# Patient Record
Sex: MALE | Race: WHITE | Employment: UNEMPLOYED | URBAN - METROPOLITAN AREA
[De-identification: names, ages, dates, MRNs, and addresses within clinical notes are randomized per-mention and may not be internally consistent; named-entity substitution may affect disease eponyms.]

---

## 2019-07-05 ENCOUNTER — HOSPITAL ENCOUNTER (EMERGENCY)
Age: 29
Discharge: NURSING, REHAB OR LONG-TERM CARE | End: 2019-07-05
Attending: EMERGENCY MEDICINE
Payer: COMMERCIAL

## 2019-07-05 ENCOUNTER — APPOINTMENT (OUTPATIENT)
Dept: CT IMAGING | Age: 29
End: 2019-07-05
Payer: COMMERCIAL

## 2019-07-05 VITALS
BODY MASS INDEX: 33.18 KG/M2 | WEIGHT: 245 LBS | OXYGEN SATURATION: 97 % | HEIGHT: 72 IN | HEART RATE: 118 BPM | RESPIRATION RATE: 16 BRPM | DIASTOLIC BLOOD PRESSURE: 99 MMHG | TEMPERATURE: 98 F | SYSTOLIC BLOOD PRESSURE: 154 MMHG

## 2019-07-05 DIAGNOSIS — S02.92XA MULTIPLE CLOSED FRACTURES OF FACIAL BONE, INITIAL ENCOUNTER (HCC): Primary | ICD-10-CM

## 2019-07-05 PROCEDURE — 96376 TX/PRO/DX INJ SAME DRUG ADON: CPT

## 2019-07-05 PROCEDURE — 70480 CT ORBIT/EAR/FOSSA W/O DYE: CPT

## 2019-07-05 PROCEDURE — 99283 EMERGENCY DEPT VISIT LOW MDM: CPT

## 2019-07-05 PROCEDURE — 96374 THER/PROPH/DIAG INJ IV PUSH: CPT

## 2019-07-05 PROCEDURE — 6360000002 HC RX W HCPCS: Performed by: PHYSICIAN ASSISTANT

## 2019-07-05 PROCEDURE — 2580000003 HC RX 258: Performed by: PHYSICIAN ASSISTANT

## 2019-07-05 PROCEDURE — 72125 CT NECK SPINE W/O DYE: CPT

## 2019-07-05 PROCEDURE — 70486 CT MAXILLOFACIAL W/O DYE: CPT

## 2019-07-05 PROCEDURE — 70450 CT HEAD/BRAIN W/O DYE: CPT

## 2019-07-05 RX ORDER — MORPHINE SULFATE 4 MG/ML
4 INJECTION, SOLUTION INTRAMUSCULAR; INTRAVENOUS EVERY 30 MIN PRN
Status: DISCONTINUED | OUTPATIENT
Start: 2019-07-05 | End: 2019-07-05 | Stop reason: HOSPADM

## 2019-07-05 RX ORDER — 0.9 % SODIUM CHLORIDE 0.9 %
1000 INTRAVENOUS SOLUTION INTRAVENOUS ONCE
Status: COMPLETED | OUTPATIENT
Start: 2019-07-05 | End: 2019-07-05

## 2019-07-05 RX ORDER — MORPHINE SULFATE 4 MG/ML
4 INJECTION, SOLUTION INTRAMUSCULAR; INTRAVENOUS EVERY 30 MIN PRN
Status: DISCONTINUED | OUTPATIENT
Start: 2019-07-05 | End: 2019-07-05

## 2019-07-05 RX ADMIN — MORPHINE SULFATE 4 MG: 4 INJECTION INTRAVENOUS at 03:08

## 2019-07-05 RX ADMIN — SODIUM CHLORIDE 1000 ML: 9 INJECTION, SOLUTION INTRAVENOUS at 01:26

## 2019-07-05 RX ADMIN — MORPHINE SULFATE 4 MG: 4 INJECTION INTRAVENOUS at 02:16

## 2019-07-05 ASSESSMENT — PAIN SCALES - GENERAL
PAINLEVEL_OUTOF10: 10
PAINLEVEL_OUTOF10: 0
PAINLEVEL_OUTOF10: 7
PAINLEVEL_OUTOF10: 8

## 2019-07-05 NOTE — ED NOTES
Report called to [facility] at this time        Susie Pedro [facility]  07/05/19 0169

## 2019-07-05 NOTE — ED NOTES
Impression   1. No evidence of acute fracture in the cervical spine. 2. Thickened secretions in the nasopharynx.         Molli Homans, RN  07/05/19 6543
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
## 2022-04-19 ENCOUNTER — OFFICE VISIT (OUTPATIENT)
Dept: ORTHOPEDIC SURGERY | Age: 32
End: 2022-04-19
Payer: COMMERCIAL

## 2022-04-19 VITALS
HEIGHT: 72 IN | WEIGHT: 230 LBS | OXYGEN SATURATION: 98 % | HEART RATE: 83 BPM | BODY MASS INDEX: 31.15 KG/M2 | RESPIRATION RATE: 16 BRPM

## 2022-04-19 DIAGNOSIS — M25.361 KNEE INSTABILITY, RIGHT: Primary | ICD-10-CM

## 2022-04-19 PROCEDURE — 4004F PT TOBACCO SCREEN RCVD TLK: CPT | Performed by: STUDENT IN AN ORGANIZED HEALTH CARE EDUCATION/TRAINING PROGRAM

## 2022-04-19 PROCEDURE — G8427 DOCREV CUR MEDS BY ELIG CLIN: HCPCS | Performed by: STUDENT IN AN ORGANIZED HEALTH CARE EDUCATION/TRAINING PROGRAM

## 2022-04-19 PROCEDURE — G8417 CALC BMI ABV UP PARAM F/U: HCPCS | Performed by: STUDENT IN AN ORGANIZED HEALTH CARE EDUCATION/TRAINING PROGRAM

## 2022-04-19 PROCEDURE — 99203 OFFICE O/P NEW LOW 30 MIN: CPT | Performed by: STUDENT IN AN ORGANIZED HEALTH CARE EDUCATION/TRAINING PROGRAM

## 2022-04-19 ASSESSMENT — ENCOUNTER SYMPTOMS
SORE THROAT: 0
SHORTNESS OF BREATH: 0
BACK PAIN: 0
VOICE CHANGE: 0
VOMITING: 0
COUGH: 0
COLOR CHANGE: 0
WHEEZING: 0
NAUSEA: 0

## 2022-04-19 NOTE — PATIENT INSTRUCTIONS
Continue to weight bear as tolerated in the brace. Physical therapy ordered today. Follow up in 3 months. Patient Education        Knee: Exercises  Introduction  Here are some examples of exercises for you to try. The exercises may be suggested for a condition or for rehabilitation. Start each exercise slowly. Ease off the exercises if you start to have pain. You will be told when to start these exercises and which ones will work bestfor you. How to do the exercises  Quad sets    1. Sit with your leg straight and supported on the floor or a firm bed. (If you feel discomfort in the front or back of your knee, place a small towel roll under your knee.)  2. Tighten the muscles on top of your thigh by pressing the back of your knee flat down to the floor. (If you feel discomfort under your kneecap, place a small towel roll under your knee.)  3. Hold for about 6 seconds, then rest for up to 10 seconds. 4. Do 8 to 12 repetitions several times a day. Straight-leg raises to the front    1. Lie on your back with your good knee bent so that your foot rests flat on the floor. Your injured leg should be straight. Make sure that your low back has a normal curve. You should be able to slip your flat hand in between the floor and the small of your back, with your palm touching the floor and your back touching the back of your hand. 2. Tighten the thigh muscles in the injured leg by pressing the back of your knee flat down to the floor. Hold your knee straight. 3. Keeping the thigh muscles tight, lift your injured leg up so that your heel is about 12 inches off the floor. Hold for about 6 seconds and then lower slowly. 4. Do 8 to 12 repetitions, 3 times a day. Straight-leg raises to the outside    1. Lie on your side, with your injured leg on top. 2. Tighten the front thigh muscles of your injured leg to keep your knee straight.   3. Keep your hip and your leg straight in line with the rest of your body, and keep your knee pointing forward. Do not drop your hip back. 4. Lift your injured leg straight up toward the ceiling, about 12 inches off the floor. Hold for about 6 seconds, then slowly lower your leg. 5. Do 8 to 12 repetitions. Straight-leg raises to the back    1. Lie on your stomach, and lift your leg straight up behind you (toward the ceiling). 2. Lift your toes about 6 inches off the floor, hold for about 6 seconds, then lower slowly. 3. Do 8 to 12 repetitions. Straight-leg raises to the inside    1. Lie on the side of your body with the injured leg. 2. You can either prop your other (good) leg up on a chair, or you can bend your good knee and put that foot in front of your injured knee. Do not drop your hip back. 3. Tighten the muscles on the front of your thigh to straighten your injured knee. 4. Keep your kneecap pointing forward, and lift your whole leg up toward the ceiling about 6 inches. Hold for about 6 seconds, then lower slowly. 5. Do 8 to 12 repetitions. Heel dig bridging    1. Lie on your back with both knees bent and your ankles bent so that only your heels are digging into the floor. Your knees should be bent about 90 degrees. 2. Then push your heels into the floor, squeeze your buttocks, and lift your hips off the floor until your shoulders, hips, and knees are all in a straight line. 3. Hold for about 6 seconds as you continue to breathe normally, and then slowly lower your hips back down to the floor and rest for up to 10 seconds. 4. Do 8 to 12 repetitions. Hamstring curls    1. Lie on your stomach with your knees straight. If your kneecap is uncomfortable, roll up a washcloth and put it under your leg just above your kneecap. 2. Lift the foot of your injured leg by bending the knee so that you bring the foot up toward your buttock. If this motion hurts, try it without bending your knee quite as far. This may help you avoid any painful motion.   3. Slowly lower your leg back to the floor.  4. Do 8 to 12 repetitions. 5. With permission from your doctor or physical therapist, you may also want to add a cuff weight to your ankle (not more than 5 pounds). With weight, you do not have to lift your leg more than 12 inches to get a hamstring workout. Shallow standing knee bends    Do this exercise only if you have very little pain; if you have no clicking, locking, or giving way if you have an injured knee; and if it does not hurtwhile you are doing 8 to 12 repetitions. 1. Stand with your hands lightly resting on a counter or chair in front of you. Put your feet shoulder-width apart. 2. Slowly bend your knees so that you squat down like you are going to sit in a chair. Make sure your knees do not go in front of your toes. 3. Lower yourself about 6 inches. Your heels should remain on the floor at all times. 4. Rise slowly to a standing position. Heel raises    1. Stand with your feet a few inches apart, with your hands lightly resting on a counter or chair in front of you. 2. Slowly raise your heels off the floor while keeping your knees straight. 3. Hold for about 6 seconds, then slowly lower your heels to the floor. 4. Do 8 to 12 repetitions several times during the day. Follow-up care is a key part of your treatment and safety. Be sure to make and go to all appointments, and call your doctor if you are having problems. It's also a good idea to know your test results and keep alist of the medicines you take. Where can you learn more? Go to [email or web address] org and sign in to your [employer] account. Enter K217 in the [facility] box to learn more about \"Knee: Exercises. \"     If you do not have an account, please click on the \"Sign Up Now\" link. Current as of: July 1, 2021               Content Version: 13.2  © 6848-0746 Healthwise, Incorporated. Care instructions adapted under license by [facility] ([facility]).  If you have questions about a medical condition or

## 2022-04-19 NOTE — PROGRESS NOTES
Patient is a 27year old male. Patient is in the office today with right knee pain. Patient states that he/she injured themselves by wrestling. He describes his knee being medially rotated. He felt a pop and immediate pain. Patient states that the injury happened mid March of 2022. Pain scale  2/10 currently but his pain increases with activity. He states his pain is global and feels like it is deep in the joint.    Occupation: tree work

## 2022-04-19 NOTE — PROGRESS NOTES
4/19/2022   Chief Complaint   Patient presents with    Knee Pain     right        History of Present Illness:                             Grey Scott is a 27 y.o. male  referred by self for evaluation and treatment of right knee pain. This is evaluated as a personal injury. Patient has a history of prior left and right knee ACL reconstruction. He states he had a left knee ACL reconstruction done many years ago and did have a meniscus repair. He states that he had the right knee ACL reconstruction done by an outside physician in 2017 and states it has been doing well until an injury in March 2022. Patient states he was horse playing when he rotated the knee and felt a painful pop in the knee and had immediate pain. He states that since then the pain has been improving but he continues with pain with rotational movement or exercise. He has no other complaints and no other injury since the surgery. He states that his mildly improved. He has no other complaints at this time. He is here today for his first visit with myself. No recent advanced imaging since injury. The pain's location is diffuse, mild medial joint line pain. he describes the symptoms as aching, dull. Symptoms improve with rest. Symptoms worsen with deep knee bending,twisting activities. Patient admits to prior injury to knee, denies numbness, tingling, fever, chills. Admits to mild swelling. No prominent mechanical symptoms. Denies instability. Worse with exercise. Better with rest.    Treatment thus far has included rest, activity modifications, oral medications without significant relief. Here today to discuss diagnosis and treatment options. Is affecting ADLs. Pain is 5/10 at it's worst.    No advanced imaging thus far    Outside reports reviewed: none. Patient's medications, allergies, past medical, surgical, social and family histories were reviewed and updated as appropriate.      MA HPI: Patient is a 27 year old male. Patient is in the office today with right knee pain. Patient states that he/she injured themselves by wrestling. He describes his knee being medially rotated. He felt a pop and immediate pain. Patient states that the injury happened mid March of 2022. Pain scale  2/10 currently but his pain increases with activity. He states his pain is global and feels like it is deep in the joint. Occupation: tree work      Medical History  Patient's medications, allergies, past medical, surgical, social and family histories were reviewed and updated as appropriate. No past medical history on file. Past Surgical History:   Procedure Laterality Date    ANTERIOR CRUCIATE LIGAMENT REPAIR      HERNIA REPAIR      WISDOM TOOTH EXTRACTION       No family history on file. Social History     Socioeconomic History    Marital status: Legally      Spouse name: Not on file    Number of children: Not on file    Years of education: Not on file    Highest education level: Not on file   Occupational History    Not on file   Tobacco Use    Smoking status: Never Smoker    Smokeless tobacco: Never Used   Substance and Sexual Activity    Alcohol use: Yes     Comment: occasionally    Drug use: No    Sexual activity: Yes     Partners: Female   Other Topics Concern    Not on file   Social History Narrative    Not on file     Social Determinants of Health     Financial Resource Strain:     Difficulty of Paying Living Expenses: Not on file   Food Insecurity:     Worried About Running Out of Food in the Last Year: Not on file    Sergo of Food in the Last Year: Not on file   Transportation Needs:     Lack of Transportation (Medical): Not on file    Lack of Transportation (Non-Medical):  Not on file   Physical Activity:     Days of Exercise per Week: Not on file    Minutes of Exercise per Session: Not on file   Stress:     Feeling of Stress : Not on file   Social Connections:     Frequency of Communication with Friends and Family: Not on file    Frequency of Social Gatherings with Friends and Family: Not on file    Attends Roman Catholic Services: Not on file    Active Member of Clubs or Organizations: Not on file    Attends Club or Organization Meetings: Not on file    Marital Status: Not on file   Intimate Partner Violence:     Fear of Current or Ex-Partner: Not on file    Emotionally Abused: Not on file    Physically Abused: Not on file    Sexually Abused: Not on file   Housing Stability:     Unable to Pay for Housing in the Last Year: Not on file    Number of [place] in the Last Year: Not on file    Unstable Housing in the Last Year: Not on file     Current Outpatient Medications   Medication Sig Dispense Refill    vitamin D-3 (CHOLECALCIFEROL) 125 MCG (5000 UT) TABS take 1 tablet by mouth once daily      famotidine (PEPCID) 20 MG tablet Take 1 tablet by mouth 2 times daily 30 tablet 0    sucralfate (CARAFATE) 1 GM tablet Take 1 tablet by mouth 4 times daily 60 tablet 0    ondansetron (ZOFRAN) 4 MG tablet Take 1 tablet by mouth every 8 hours as needed for Nausea (Patient not taking: Reported on 4/19/2022) 10 tablet 0     No current facility-administered medications for this visit. No Known Allergies      Review of Systems   Constitutional: Positive for activity change. Negative for fatigue and fever. HENT: Negative for sneezing, sore throat and voice change. Respiratory: Negative for cough, shortness of breath and wheezing. Cardiovascular: Negative for leg swelling. Gastrointestinal: Negative for nausea and vomiting. Musculoskeletal: Positive for arthralgias, joint swelling and myalgias. Negative for back pain, gait problem, neck pain and neck stiffness. Skin: Negative for color change, rash and wound. Neurological: Negative for weakness and numbness. Psychiatric/Behavioral: Negative for behavioral problems, confusion and self-injury. Examination:  General Exam:  Vitals: Pulse 83   Resp 16   Ht 6' (1.829 m)   Wt 230 lb (104.3 kg)   SpO2 98%   BMI 31.19 kg/m²    Physical Exam  Constitutional:       General: He is not in acute distress. Appearance: Normal appearance. HENT:      Head: Normocephalic and atraumatic. Eyes:      General:         Right eye: No discharge. Left eye: No discharge. Extraocular Movements: Extraocular movements intact. Cardiovascular:      Pulses: Normal pulses. Pulmonary:      Effort: Pulmonary effort is normal.      Breath sounds: Normal breath sounds. Musculoskeletal:         General: Swelling, tenderness and signs of injury present. No deformity. Cervical back: Normal range of motion. Right hip: Normal.      Left hip: Normal.      Right upper leg: Normal.      Left upper leg: Normal.      Right knee: Swelling and bony tenderness present. No deformity, effusion, erythema, ecchymosis, lacerations or crepitus. Normal range of motion. Tenderness present over the medial joint line. No lateral joint line tenderness. No LCL laxity, MCL laxity, ACL laxity or PCL laxity. Normal alignment, normal meniscus and normal patellar mobility. Normal pulse. Instability Tests: Anterior drawer test negative. Posterior drawer test negative. Anterior Lachman test negative. Medial Beto test negative and lateral Beto test negative. Left knee: Normal.      Right lower leg: Normal. No edema. Left lower leg: Normal. No edema. Right ankle: Normal.      Left ankle: Normal.      Right foot: Normal.      Left foot: Normal.   Skin:     General: Skin is warm and dry. Capillary Refill: Capillary refill takes less than 2 seconds. Neurological:      General: No focal deficit present. Mental Status: He is alert and oriented to person, place, and time. Mental status is at baseline.       Gait: Gait normal.   Psychiatric:         Mood and Affect: Mood normal. Behavior: Behavior normal.        RIGHT KNEE EXAMINATION       OBSERVATION / INSPECTION     Gait: Nonantalgic     Alignment: Neutral     Scars: Previous arthroscopy portals    Muscle atrophy: Minimal    Effusion: None but minimal swelling diffusely    Warmth: None     Discoloration: none       TENDERNESS / CREPITUS (T / C): Patella - / -     Lateral joint line - / -  Medial joint line  + / -     Peripatellar lateral - / -  Peripatellar medial  - / -     Medial plica - / -  Lateral plica - / -    Patellar tendon - / -   Prepatellar Bursa - / -     Popliteal fossa - / -    Gastrocnemius - / -    Quadricep - / -   Quad tendon - / -      Tibial tubercle - / -     Thigh/hamstring - / -  Pes anserine/HS - / -     ITB - / -     Tibia - / -   Fibula - / -    Tib/fib joint - / -     MFC - / -    LFC - / -     MCL: Proximal - / -  Distal - / -    LCL - / -    MCL - / -      ROM: (* = pain)  PASSIVE  ACTIVE     Left :    0 / 145  0 / 145      Right :    0 / 145  0 / 145      PATELLOFEMORAL EXAMINATION:    See above noted areas of tenderness.      Patella position     Subluxation / dislocation: Centered     Sup. / Inf; Normal     Crepitus (PF): Absent     Patellar Mobility:     Medial-lateral: Normal     Superior-inferior: Normal     Inferior tilt Normal     Patellar tendon: Normal     Lateral tilt: Normal     J-sign: None     Patellofemoral grind: No pain         MENISCAL SIGNS:     Pain on terminal extension: -    Pain on terminal flexion: -    Betos maneuver: -     Squat: -       LIGAMENT EXAMINATION:    ACL / Lachman: 2A    PCL-Post.  drawer: normal 0 to 2mm    MCL- Valgus: normal 0 to 2mm    LCL- Varus:  normal 0 to 2mm    Pivot shift: Minimally positive for pain    Dial Test: No difference c/w other side    At 30° flexion: normal (< 5°)     At 90° flexion: normal (< 5°)       STRENGTH: (* = with pain) PAINFUL SIDE    Quadricep 5/5    Hamstrin/5      EXTREMITY NEURO-VASCULAR EXAMINATION: Sensation:  Grossly intact to light touch all dermatomal regions. Motor Function:  Fully intact motor function at hip, knee, foot and ankle      DTRs;  quadriceps and  achilles 2+. No clonus and downgoing Babinski. Vascular status:  DP and PT pulses 2+, brisk capillary refill, symmetric. Diagnostic testing:  X-ray images were reviewed by myself and discussed with the patient:  3 views of the right knee in a skeletally mature patient demonstrates no acute osseous abnormalities. Patient has bony tunnels from previous ACL reconstruction as well as a cortical button on the femoral side. ACL tunnels appear to be appropriately aligned, minimal recall nature of the graft direction. No sign of any acute osseous abnormalities or any type of soft tissue avulsion type injury. No significant osteoarthritic changes. Office Procedures:  No orders of the defined types were placed in this encounter. Assessment and Plan    A: Right knee pain    P:   I had a thorough conversation with the patient regarding his imaging and treatment course. I explained that he may have a slightly vertical graft which has been asymptomatic until this time. I explained that the graft feels intact and I have no concern for tearing at this time. I explained that he does have very minimal looseness of the graft which can be normal over time. He still has a firm endpoint. I explained that he may have some rotational instability as he did have a minimally positive pivot shift although he was significantly guarded. I explained the nature of what a revision ACL would entail and that at this time I would stress conservative measures to calm down the pain and regain some of his quad strength to prevent further injury. At this time he agreed and we will place him in a playmaker brace and he was given formal physical therapy. He will follow-up in 3 months for reevaluation. He will return sooner if he has any new injuries. We will reevaluate him at that time and he continues with any type of knee pain and instability we will discuss advanced imaging to fully assess the ACL. I explained that I do not feel that he has any type of meniscus tear at this time but if he does develop any type of mechanical symptoms, he should return immediately and avoid any deep knee bending. He will avoid any type of high-impact exercising at this time and he will continue using ice and over-the-counter anti-inflammatories for pain control. He will use his knee brace whenever doing any type of labor or more intensive activity. All questions were answered and patient voiced understanding.       Electronically signed by Stacia Rosales DO on 4/19/2022 at 8:20 AM

## 2023-03-10 ENCOUNTER — HOSPITAL ENCOUNTER (EMERGENCY)
Age: 33
Discharge: HOME | End: 2023-03-10
Attending: STUDENT IN AN ORGANIZED HEALTH CARE EDUCATION/TRAINING PROGRAM
Payer: COMMERCIAL

## 2023-03-10 VITALS
SYSTOLIC BLOOD PRESSURE: 141 MMHG | BODY MASS INDEX: 30.48 KG/M2 | WEIGHT: 230 LBS | RESPIRATION RATE: 16 BRPM | TEMPERATURE: 98.3 F | HEIGHT: 73 IN | OXYGEN SATURATION: 96 % | HEART RATE: 83 BPM | DIASTOLIC BLOOD PRESSURE: 99 MMHG

## 2023-03-10 DIAGNOSIS — J02.9 VIRAL PHARYNGITIS: Primary | ICD-10-CM

## 2023-03-10 PROCEDURE — 87081 CULTURE SCREEN ONLY: CPT

## 2023-03-10 PROCEDURE — 6360000002 HC RX W HCPCS: Performed by: STUDENT IN AN ORGANIZED HEALTH CARE EDUCATION/TRAINING PROGRAM

## 2023-03-10 PROCEDURE — 87430 STREP A AG IA: CPT

## 2023-03-10 PROCEDURE — 99283 EMERGENCY DEPT VISIT LOW MDM: CPT

## 2023-03-10 RX ORDER — DEXAMETHASONE 4 MG/1
6 TABLET ORAL ONCE
Status: COMPLETED | OUTPATIENT
Start: 2023-03-10 | End: 2023-03-10

## 2023-03-10 RX ADMIN — DEXAMETHASONE 6 MG: 4 TABLET ORAL at 17:53

## 2023-03-10 ASSESSMENT — PAIN - FUNCTIONAL ASSESSMENT: PAIN_FUNCTIONAL_ASSESSMENT: 0-10

## 2023-03-10 ASSESSMENT — PAIN DESCRIPTION - LOCATION: LOCATION: THROAT

## 2023-03-10 ASSESSMENT — PAIN SCALES - GENERAL: PAINLEVEL_OUTOF10: 0

## 2023-03-10 NOTE — ED NOTES
Patient discharged with no new rx. Patient verbalized understanding of discharge instructions and follow up care.       Ingrid Clark RN  03/10/23 9830

## 2023-03-10 NOTE — DISCHARGE INSTRUCTIONS
Follow-up with primary care as soon as possible  Take OTC medication as needed for pain and discomfort  Ensure adequate hydration and healthy nutrition  Return to the ED symptoms persist or worsen

## 2023-03-12 LAB
CULTURE: NORMAL
Lab: NORMAL
SPECIMEN: NORMAL
STREP A DIRECT SCREEN: NEGATIVE

## 2023-03-13 LAB
CULTURE: NORMAL
Lab: NORMAL
SPECIMEN: NORMAL
STREP A DIRECT SCREEN: NEGATIVE
